# Patient Record
Sex: FEMALE | Race: BLACK OR AFRICAN AMERICAN
[De-identification: names, ages, dates, MRNs, and addresses within clinical notes are randomized per-mention and may not be internally consistent; named-entity substitution may affect disease eponyms.]

---

## 2017-08-30 ENCOUNTER — HOSPITAL ENCOUNTER (EMERGENCY)
Dept: HOSPITAL 17 - NEPK | Age: 22
Discharge: HOME | End: 2017-08-30
Payer: COMMERCIAL

## 2017-08-30 VITALS — WEIGHT: 114.64 LBS | BODY MASS INDEX: 24.06 KG/M2 | HEIGHT: 58 IN

## 2017-08-30 VITALS
HEART RATE: 105 BPM | DIASTOLIC BLOOD PRESSURE: 66 MMHG | RESPIRATION RATE: 16 BRPM | SYSTOLIC BLOOD PRESSURE: 134 MMHG | TEMPERATURE: 98.7 F | OXYGEN SATURATION: 99 %
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
## 2017-08-30 DIAGNOSIS — S60.032A: Primary | ICD-10-CM

## 2017-08-30 PROCEDURE — 73140 X-RAY EXAM OF FINGER(S): CPT

## 2017-08-30 PROCEDURE — 11740 EVACUATION SUBUNGUAL HMTMA: CPT

## 2017-08-30 NOTE — PD
HPI


Chief Complaint:  Pain: Acute or Chronic


Time Seen by Provider:  22:45


Travel History


International Travel<30 days:  No


Contact w/Intl Traveler<30days:  No


Traveled to known affect area:  No





History of Present Illness


HPI


22-year-old female presents for evaluation of an injury to the distal left 

third finger.  She closed the car door on her left third finger yesterday.  She 

has developed some subungual hematoma which is painful, aching, worse with 

palpation.  Her friend put a needle through her nail which seemed to help some 

however the symptoms have persisted which prompted evaluation.  Last tetanus 

vaccination 1 year ago.  No other complaints.





PFSH


Past Medical History


Cardiovascular Problems:  Yes (hearth murmur)


Diminished Hearing:  No


Immunizations Current:  No


Tetanus Vaccination:  < 5 Years


Pregnant?:  Not Pregnant


LMP:  8/29





Past Surgical History


Surgical History:  No Previous Surgery





Social History


Alcohol Use:  No


Tobacco Use:  No


Substance Use:  Yes (thc)





Allergies-Medications


(Allergen,Severity, Reaction):  


Coded Allergies:  


     No Known Allergies (Unverified , 4/17/16)


Reported Meds & Prescriptions





Reported Meds & Active Scripts


Active








Review of Systems


Musculoskeletal:  Positive: Pain


Skin:  Positive Other (pain, subungual hematoma)





Physical Exam


Narrative


GENERAL: Well-nourished female in no acute distress


SKIN: Warm and dry.


Extremities: Left third finger nail subungual hematoma proximal 25% of the 

nail.  The nail is not loose.





Data


Data


Last Documented VS





Vital Signs








  Date Time  Temp Pulse Resp B/P (MAP) Pulse Ox O2 Delivery O2 Flow Rate FiO2


 


8/30/17 18:54 98.7 105 16 134/66 (88) 99   








Orders





 Orders


Finger ([id number]) (8/30/17 )








MDM


Medical Decision Making


Medical Screen Exam Complete:  Yes


Emergency Medical Condition:  Yes


Medical Record Reviewed:  Yes


Differential Diagnosis


Subungual hematoma, nail bed laceration, fracture


Narrative Course


The patient presents with a injury to left third finger sustained yesterday 

morning.  Examination reveals a subungual hematoma.  An x-ray was performed 

revealing no evidence of fracture.  The patient verbally consents to nail 

trephination.  Significant improvement of symptoms with minimal subungual blood 

present after the procedure.  She is stable for discharge.





Procedures


**Procedure Narrative**


Nail trephination: No trephination was performed utilizing a cautery after the 

area was cleansed with Betadine.  Patient tolerated procedure well.





Diagnosis





 Primary Impression:  


 Subungual hematoma of finger


 Qualified Codes:  S60.10XA - Contusion of unspecified finger with damage to 

nail, initial encounter





***Additional Instructions:  


Wash with soap and water and apply antibiotic cream daily.  Tylenol or Motrin 

for pain.


***Med/Other Pt SpecificInfo:  Wound Care


Disposition:  01 DISCHARGE HOME


Condition:  Stable











Jone Pandey Aug 30, 2017 22:48

## 2017-08-30 NOTE — PD
Physical Exam


Date Seen by Provider:  Aug 30, 2017


Time Seen by Provider:  19:04


Narrative


23 yo female here for evaluation of finger injury. Left middle finger. Happened 

yesterday on a car door. No other complains. 


Vitals stable in triage. Awaiting bed placement





Data


Data


Last Documented VS





Vital Signs








  Date Time  Temp Pulse Resp B/P (MAP) Pulse Ox O2 Delivery O2 Flow Rate FiO2


 


8/30/17 18:54 98.7 105 16 134/66 (88) 99   











MDM


Medical Record Reviewed:  Yes


Supervised Visit with GÉNESIS:  Luis Culver Aug 30, 2017 19:06

## 2017-08-30 NOTE — RADRPT
EXAM DATE/TIME:  08/30/2017 19:27 

 

[facility] COMPARISON:     

No previous studies available for comparison.

 

                     

INDICATIONS :     

Left distal middle finger was smashed in a car door yesterday.

                     

 

MEDICAL HISTORY :     

None.          

 

SURGICAL HISTORY :     

None.   

 

ENCOUNTER:     

Initial                                        

 

ACUITY:     

1 day      

 

PAIN SCORE:     

7/10

 

LOCATION:     

Left  distal 3rd digit.

 

FINDINGS:     

Examination of the third digit of the left hand demonstrates no evidence of fracture or dislocation. 
 No radiopaque foreign bodies are seen.  The soft tissues are intact.

 

CONCLUSION:     No acute disease.  

 

 

 

 Sam Al MD on August 30, 2017 at 20:14           

Board Certified Radiologist.

 This report was verified electronically.

## 2018-04-11 ENCOUNTER — HOSPITAL ENCOUNTER (EMERGENCY)
Dept: HOSPITAL 17 - NEPC | Age: 23
Discharge: HOME | End: 2018-04-11
Payer: COMMERCIAL

## 2018-04-11 VITALS
RESPIRATION RATE: 17 BRPM | DIASTOLIC BLOOD PRESSURE: 69 MMHG | TEMPERATURE: 98.2 F | HEART RATE: 88 BPM | OXYGEN SATURATION: 100 % | SYSTOLIC BLOOD PRESSURE: 133 MMHG

## 2018-04-11 VITALS
OXYGEN SATURATION: 100 % | SYSTOLIC BLOOD PRESSURE: 127 MMHG | RESPIRATION RATE: 16 BRPM | DIASTOLIC BLOOD PRESSURE: 67 MMHG | HEART RATE: 85 BPM

## 2018-04-11 VITALS — BODY MASS INDEX: 24.53 KG/M2 | WEIGHT: 116.84 LBS | HEIGHT: 58 IN

## 2018-04-11 DIAGNOSIS — M41.9: ICD-10-CM

## 2018-04-11 DIAGNOSIS — I49.9: ICD-10-CM

## 2018-04-11 DIAGNOSIS — M54.9: Primary | ICD-10-CM

## 2018-04-11 DIAGNOSIS — F12.10: ICD-10-CM

## 2018-04-11 DIAGNOSIS — Z72.0: ICD-10-CM

## 2018-04-11 LAB
BASOPHILS # BLD AUTO: 0 TH/MM3 (ref 0–0.2)
BASOPHILS NFR BLD: 0.4 % (ref 0–2)
BUN SERPL-MCNC: 10 MG/DL (ref 7–18)
CALCIUM SERPL-MCNC: 9 MG/DL (ref 8.5–10.1)
CHLORIDE SERPL-SCNC: 103 MEQ/L (ref 98–107)
CREAT SERPL-MCNC: 0.77 MG/DL (ref 0.5–1)
EOSINOPHIL # BLD: 0.1 TH/MM3 (ref 0–0.4)
EOSINOPHIL NFR BLD: 1 % (ref 0–4)
ERYTHROCYTE [DISTWIDTH] IN BLOOD BY AUTOMATED COUNT: 13 % (ref 11.6–17.2)
GFR SERPLBLD BASED ON 1.73 SQ M-ARVRAT: 115 ML/MIN (ref 89–?)
GLUCOSE SERPL-MCNC: 87 MG/DL (ref 74–106)
HCO3 BLD-SCNC: 26.5 MEQ/L (ref 21–32)
HCT VFR BLD CALC: 41.1 % (ref 35–46)
HGB BLD-MCNC: 13.6 GM/DL (ref 11.6–15.3)
LYMPHOCYTES # BLD AUTO: 1.6 TH/MM3 (ref 1–4.8)
LYMPHOCYTES NFR BLD AUTO: 14.3 % (ref 9–44)
MCH RBC QN AUTO: 29.2 PG (ref 27–34)
MCHC RBC AUTO-ENTMCNC: 33 % (ref 32–36)
MCV RBC AUTO: 88.5 FL (ref 80–100)
MONOCYTE #: 1 TH/MM3 (ref 0–0.9)
MONOCYTES NFR BLD: 8.6 % (ref 0–8)
NEUTROPHILS # BLD AUTO: 8.7 TH/MM3 (ref 1.8–7.7)
NEUTROPHILS NFR BLD AUTO: 75.7 % (ref 16–70)
PLATELET # BLD: 430 TH/MM3 (ref 150–450)
PMV BLD AUTO: 7 FL (ref 7–11)
RBC # BLD AUTO: 4.65 MIL/MM3 (ref 4–5.3)
SODIUM SERPL-SCNC: 137 MEQ/L (ref 136–145)
TROPONIN I SERPL-MCNC: (no result) NG/ML (ref 0.02–0.05)
WBC # BLD AUTO: 11.4 TH/MM3 (ref 4–11)

## 2018-04-11 PROCEDURE — 84703 CHORIONIC GONADOTROPIN ASSAY: CPT

## 2018-04-11 PROCEDURE — 82550 ASSAY OF CK (CPK): CPT

## 2018-04-11 PROCEDURE — 82552 ASSAY OF CPK IN BLOOD: CPT

## 2018-04-11 PROCEDURE — 96374 THER/PROPH/DIAG INJ IV PUSH: CPT

## 2018-04-11 PROCEDURE — 84484 ASSAY OF TROPONIN QUANT: CPT

## 2018-04-11 PROCEDURE — 93005 ELECTROCARDIOGRAM TRACING: CPT

## 2018-04-11 PROCEDURE — 80048 BASIC METABOLIC PNL TOTAL CA: CPT

## 2018-04-11 PROCEDURE — 71046 X-RAY EXAM CHEST 2 VIEWS: CPT

## 2018-04-11 PROCEDURE — 99285 EMERGENCY DEPT VISIT HI MDM: CPT

## 2018-04-11 PROCEDURE — 85379 FIBRIN DEGRADATION QUANT: CPT

## 2018-04-11 PROCEDURE — 85025 COMPLETE CBC W/AUTO DIFF WBC: CPT

## 2018-04-11 NOTE — RADRPT
EXAM DATE/TIME:  04/11/2018 13:53 

 

[facility] COMPARISON:     

No previous studies available for comparison.

 

                     

INDICATIONS :     

Chest and right side back pain.

                     

 

MEDICAL HISTORY :            

Heart murmur.   

 

SURGICAL HISTORY :     

None.   

 

ENCOUNTER:     

Initial                                        

 

ACUITY:     

2 days      

 

PAIN SCORE:     

5/10

 

LOCATION:     

Left middle chest

 

FINDINGS:     

PA and lateral views of the chest demonstrate the lungs to be symmetrically aerated without evidence 
of mass, infiltrate or effusion.  

Mild prominence of the cardiac silhouette.  Scoliosis.

 

CONCLUSION:     

Scoliosis with mild prominence of the cardiac silhouette..

 

 

 

 Aaron Lauren MD FACR on April 11, 2018 at 14:04           

Board Certified Radiologist.

 This report was verified electronically.

## 2018-04-11 NOTE — PD
HPI


Chief Complaint:  Pain: Acute or Chronic


Time Seen by Provider:  15:44


Travel History


International Travel<30 days:  No


Contact w/Intl Traveler<30days:  No


Traveled to known affect area:  No





History of Present Illness


HPI


This is a 22-year-old female with no significant past medical history presents 

for evaluation of right-sided chest and back pain.  Symptoms started yesterday.

  The pain is a sharp pain which is worse with deep inspiration and worse with 

movement.  Symptoms are alleviated when she is sitting still.  She reports that 

she had some paresthesias on the right side of her chest today as well which 

resolved.  She denies shortness of breath, cough, congestion, nausea, vomiting, 

abdominal pain, leg swelling.  She denies any recent travel.  She is not on any 

oral contraceptives.  She endorses occasional marijuana and tobacco use.  She 

has no other complaints at this time.





PFSH


Past Medical History


Cardiovascular Problems:  Yes (hearth murmur)


Diminished Hearing:  No


Immunizations Current:  No


Pregnant?:  Not Pregnant





Social History


Alcohol Use:  No


Tobacco Use:  Yes


Substance Use:  Yes (thc)





Allergies-Medications


(Allergen,Severity, Reaction):  


Coded Allergies:  


     No Known Allergies (Unverified  Adverse Reaction, Unknown, 4/11/18)


Reported Meds & Prescriptions





Reported Meds & Active Scripts


Active


Ibuprofen 600 Mg Tab 600 Mg PO Q6H PRN 7 Days








Review of Systems


Except as stated in HPI:  all other systems reviewed are Neg





Physical Exam


Narrative


GENERAL: Well-developed well-nourished female no acute distress


SKIN: Warm and dry.


HEAD: Atraumatic. Normocephalic. 


EYES: Pupils equal and round. No scleral icterus. No injection or drainage. 


ENT: No nasal bleeding or discharge.  Mucous membranes pink and moist.


NECK: Trachea midline. No JVD. 


CARDIOVASCULAR: Regular rate and rhythm.  No murmur appreciated.


RESPIRATORY: No accessory muscle use. Clear to auscultation. Breath sounds 

equal bilaterally. 


GASTROINTESTINAL: Abdomen soft, non-tender, nondistended. Hepatic and splenic 

margins not palpable. 


MUSCULOSKELETAL: No obvious deformities.  No edema.


NEUROLOGICAL: Awake and alert. No obvious cranial nerve deficits.  Motor 

grossly within normal limits. Normal speech.





Data


Data


Last Documented VS





Vital Signs








  Date Time  Temp Pulse Resp B/P (MAP) Pulse Ox O2 Delivery O2 Flow Rate FiO2


 


4/11/18 16:10  85 16 127/67 (87) 100 Room Air  


 


4/11/18 13:35 98.2       








Orders





 Orders


Electrocardiogram (4/11/18 13:38)


Complete Blood Count With Diff (4/11/18 13:38)


Basic Metabolic Panel (Bmp) (4/11/18 13:38)


Ckmb (Isoenzyme) Profile (4/11/18 13:38)


Troponin I (4/11/18 13:38)


Chest, Pa & Lat (4/11/18 13:38)


CKMB (4/11/18 14:13)


CKMB% (4/11/18 14:13)


D-Dimer (4/11/18 15:50)


Ed Urine Pregnancytest Poc (4/11/18 15:50)


Ketorolac Inj (Toradol Inj) (4/11/18 16:00)





Labs





Laboratory Tests








Test


  4/11/18


14:13 4/11/18


16:10


 


White Blood Count 11.4 TH/MM3  


 


Red Blood Count 4.65 MIL/MM3  


 


Hemoglobin 13.6 GM/DL  


 


Hematocrit 41.1 %  


 


Mean Corpuscular Volume 88.5 FL  


 


Mean Corpuscular Hemoglobin 29.2 PG  


 


Mean Corpuscular Hemoglobin


Concent 33.0 % 


  


 


 


Red Cell Distribution Width 13.0 %  


 


Platelet Count 430 TH/MM3  


 


Mean Platelet Volume 7.0 FL  


 


Neutrophils (%) (Auto) 75.7 %  


 


Lymphocytes (%) (Auto) 14.3 %  


 


Monocytes (%) (Auto) 8.6 %  


 


Eosinophils (%) (Auto) 1.0 %  


 


Basophils (%) (Auto) 0.4 %  


 


Neutrophils # (Auto) 8.7 TH/MM3  


 


Lymphocytes # (Auto) 1.6 TH/MM3  


 


Monocytes # (Auto) 1.0 TH/MM3  


 


Eosinophils # (Auto) 0.1 TH/MM3  


 


Basophils # (Auto) 0.0 TH/MM3  


 


CBC Comment DIFF FINAL  


 


Differential Comment   


 


Blood Urea Nitrogen 10 MG/DL  


 


Creatinine 0.77 MG/DL  


 


Random Glucose 87 MG/DL  


 


Calcium Level 9.0 MG/DL  


 


Sodium Level 137 MEQ/L  


 


Potassium Level 3.7 MEQ/L  


 


Chloride Level 103 MEQ/L  


 


Carbon Dioxide Level 26.5 MEQ/L  


 


Anion Gap 8 MEQ/L  


 


Estimat Glomerular Filtration


Rate 115 ML/MIN 


  


 


 


Total Creatine Kinase 126 U/L  


 


Creatine Kinase MB 0.7 NG/ML  


 


Troponin I


  LESS THAN 0.02


NG/ML 


 


 


D-Dimer Quantitative (PE/DVT)  0.29 MG/L FEU 











[facility]


Medical Decision Making


Medical Screen Exam Complete:  Yes


Emergency Medical Condition:  Yes


Medical Record Reviewed:  Yes


Differential Diagnosis


Pleurisy, costochondritis, pericarditis, myocarditis, aortic dissection, 

pneumonia, pulmonary embolism


Narrative Course


22-year-old female presents with sharp right-sided chest and back pain which is 

worse with movement and inspiration.  She appears well.  Her lungs are clear to 

auscultation.  Abdomen is soft and nontender.  No evidence of DVT on 

examination.  Lab work and chest x-ray obtained in triage.  CBC reveals a WBC 

of 11.4, BMP is unremarkable, cardiac enzymes are negative.  EKG reveals sinus 

rhythm.  Chest x-ray reveals scoliosis with mild prominence of the cardiac 

silhouette.  A d-dimer and a urine pregnancy tests have been added on.  The 

patient will be given Toradol for her pleuritic chest pain.


D-dimer is negative.  The patient has been stable during her hospital stay.  

She will be discharged with ibuprofen.





Diagnosis





 Primary Impression:  


 Pleurisy





***Additional Instructions:  


Medication as needed.  Avoid tobacco and marijuana products.  Follow-up with 

primary care physician in 1-2 weeks.  Return for any emergent medical 

conditions.


***Med/Other Pt SpecificInfo:  Prescription(s) given


Scripts


Ibuprofen (Ibuprofen) 600 Mg Tab


600 MG PO Q6H Y for Pain/Inflammation for 7 Days, #28 TAB 0 Refills


   Prov: Flavio Ferguson MD         4/11/18


Disposition:  01 DISCHARGE HOME


Condition:  Stable











Jone Pandey Apr 11, 2018 15:54

## 2018-04-12 NOTE — EKG
Date Performed: 04/11/2018       Time Performed: 14:07:24

 

PTAGE:      22 years

 

EKG:      Sinus rhythm 

 

 WITH SINUS ARRHYTHMIA POSSIBLE RIGHT VENTRICULAR CONDUCTION DELAY BORDERLINE ECG 

 

NO PREVIOUS TRACING            

 

DOCTOR:   Avery Negro  Interpretating Date/Time  04/12/2018 18:41:00